# Patient Record
Sex: FEMALE | Race: WHITE | NOT HISPANIC OR LATINO | Employment: UNEMPLOYED | URBAN - METROPOLITAN AREA
[De-identification: names, ages, dates, MRNs, and addresses within clinical notes are randomized per-mention and may not be internally consistent; named-entity substitution may affect disease eponyms.]

---

## 2017-01-24 ENCOUNTER — DOCUMENTATION (OUTPATIENT)
Dept: NEUROLOGY | Facility: CLINIC | Age: 82
End: 2017-01-24

## 2017-01-24 ENCOUNTER — OFFICE VISIT (OUTPATIENT)
Dept: NEUROLOGY | Facility: CLINIC | Age: 82
End: 2017-01-24

## 2017-01-24 VITALS
DIASTOLIC BLOOD PRESSURE: 68 MMHG | SYSTOLIC BLOOD PRESSURE: 130 MMHG | HEIGHT: 56 IN | BODY MASS INDEX: 26.1 KG/M2 | WEIGHT: 116 LBS

## 2017-01-24 DIAGNOSIS — I67.89 OTHER CEREBROVASCULAR DISEASE: Primary | ICD-10-CM

## 2017-01-24 PROCEDURE — 99204 OFFICE O/P NEW MOD 45 MIN: CPT | Performed by: PHYSICIAN ASSISTANT

## 2017-01-24 NOTE — PROGRESS NOTES
Subjective     History of Present Illness   Kisha Galicia is a 87 y.o. female who comes to clinic today for blurry vision, which began on 1/16/17. She noted associated impairments in memory (long term memory more so than short term memory) and orientation as well as associated anxiety. She and her family also note associated balance impairment, though she denies any recent falls. She denies any associated focal weakness, numbness, paresthesias, slurred speech, or dysphagia. She was seen by her PCP, and had an MRI of the brain performed on 1/23/16. Her family notes that this showed a left occipital infarct. However, we do not currently have these results. She also had screening blood work completed, which was notable for a platelet count of 93, but otherwise unremarkable.     She notes that her blurry vision has resolved. She and her family note that her impairments in memory and orientation continue to be present, as well as her balance impairment. She notes that she feels uncomfortable driving due to her impairment in orientation.     It should be noted that she and her family deny any forgetfulness or cognitive impairment prior to this episode. She currently manages her medications and was previously managing her finances as well.       The following portions of the patient's history were reviewed and updated as appropriate: allergies, current medications, past family history, past medical history, past social history, past surgical history and problem list.    Review of Systems   Constitutional: Negative.    HENT: Negative.    Eyes: Negative.    Respiratory: Negative.    Cardiovascular: Negative.    Gastrointestinal: Negative.    Endocrine: Negative.    Genitourinary: Negative.    Musculoskeletal: Negative.    Skin: Negative.    Allergic/Immunologic: Negative.    Neurological:        As noted in HPI   Hematological: Negative.    Psychiatric/Behavioral:        As noted in HPI       Objective     There were no  vitals taken for this visit.    General appearance today is normal.   Peripheral pulses were present and symmetric.   The ophthalmoscopic exam today is unremarkable.     Physical Exam   Neurological: She has normal strength. She has a normal Finger-Nose-Finger Test.   Reflex Scores:       Tricep reflexes are 2+ on the right side and 2+ on the left side.       Bicep reflexes are 2+ on the right side and 2+ on the left side.       Brachioradialis reflexes are 2+ on the right side and 2+ on the left side.       Patellar reflexes are 2+ on the right side and 2+ on the left side.  Psychiatric: Her speech is normal.        Neurologic Exam     Mental Status   Oriented to person.   Disoriented to place. (Oriented to state and floor. )  Oriented to time. Oriented to year, month, date, day and season.   Registration: recalls 3 of 3 objects. Recall of objects at 5 minutes: recalls 0 of 3 objects. Follows 3 step commands.   Attention: normal.   Speech: speech is normal   Level of consciousness: alert  Able to name object. Able to read. Able to repeat. Able to write.     Cranial Nerves   Cranial nerves II through XII intact.     Motor Exam   Muscle bulk: normal  Overall muscle tone: normal    Strength   Strength 5/5 throughout.     Sensory Exam   Light touch normal.     Gait, Coordination, and Reflexes     Gait  Gait: (slightly unsteady upon turning)    Coordination   Finger to nose coordination: normal    Tremor   Resting tremor: absent    Reflexes   Right brachioradialis: 2+  Left brachioradialis: 2+  Right biceps: 2+  Left biceps: 2+  Right triceps: 2+  Left triceps: 2+  Right patellar: 2+  Left patellar: 2+       Rises from chair with ease         Results  MMSE=24      Assessment/Plan   Kisha was seen today for memory loss.    Diagnoses and all orders for this visit:    Other cerebrovascular disease   -     Adult Transthoracic Echo Complete  -     US Color Flow Doppler Carotid Bilateral          Discussion/Summary    Kisha Galicia comes to clinic today following an episode of blurry vision and memory impairment. I am concerned about a potential stroke given her history and her family's report of her recent MRI results. Today, it was elected to obtain her MRI from [facility]. It was also elected to obtain an echo and carotid dopplers to complete her workup. After discussing potential treatment options, it was elected to continue on Simvastatin unchanged. We discussed potentially adding ASA or Plavix. However, it was elected to discuss this further with her primary care provider as she has a history of a gastric bleed. We discussed PT for her balance impairment, though this was ultimately declined. The patient would prefer to follow up in our clinic on an as needed basis for now, which I feel is reasonable.       I spent 45 minutes with the patient and family. I spent 85 percent of this time counseling and discussing diagnosis, diagnostic testing, evaluation, current status, driving, treatment options and management.    As part of this visit I reviewed prior lab results, reviewed outside records and obtained additional history from the family which is incorporated in the HPI.        Kenia Contreras PA-C

## 2017-01-24 NOTE — PROGRESS NOTES
Called [facility] to ask for a disk (Dicom) of the pt's MRI of the brain with and without to be mailed to our office

## 2017-01-24 NOTE — LETTER
January 24, 2017     Davey Sweet MD  [address] [facility] [place] [address]    Patient: Kisha Galicia   YOB: 1929   Date of Visit: 1/24/2017       Dear Dr. Micki MD:    Thank you for referring Kisha Galicia to me for evaluation. Below are the relevant portions of my assessment and plan of care.                   If you have questions, please do not hesitate to call me. I look forward to following Kisha along with you.         Sincerely,        Kenia Contreras PA-C        CC: No Recipients

## 2017-01-24 NOTE — PROGRESS NOTES
Called [facility] (Radiology) at [phone] and made appts for US Color Flow Doppler Carotid Bilateral and the Adult Transthoracic Echo Complete for 2-7-2017 arrive at 12:00 for a 12:30 appt for the Echo and straight into the Carotid.  I faxed over the orders to [phone] for the appt. I called and spoke with the pt's son and ask him to let the pt know of her time and date for the two procedures

## 2017-01-24 NOTE — MR AVS SNAPSHOT
"                        Kisha Frida   2017 2:00 PM   Office Visit    Dept Phone:  [phone]   Encounter #:  [id number]    Provider:  Kenia Contreras PA-C   Department:  [facility] NEUROLOGY                Your Full Care Plan              Your Updated Medication List      Notice  As of 2017  3:13 PM    You have not been prescribed any medications.            Instructions     None    Patient Instructions History      Upcoming Appointments     Visit Type Date Time Department    NEW PATIENT 2017  2:00 PM [facility]      [email or web address] Signup     [facility] "[employer] allows you to send messages to your doctor, view your test results, renew your prescriptions, schedule appointments, and more. To sign up, go to [employer] and click on the Sign Up Now link in the New User? box. Enter your "[employer] Activation Code exactly as it appears below along with the last four digits of your Social Security Number and your Date of Birth () to complete the sign-up process. If you do not sign up before the expiration date, you must request a new code.    "[employer] Activation Code: [email or web address]  Expires: 2017  3:13 PM    If you have questions, you can email [email or web address] or call [phone] to talk to our "[employer] staff. Remember, "[employer] is NOT to be used for urgent needs. For medical emergencies, dial 911.               Other Info from Your Visit           Allergies     No Known Allergies      Reason for Visit     Memory Loss           Vital Signs     Blood Pressure Height Weight Body Mass Index Smoking Status       130/68 56\" (142.2 cm) 116 lb (52.6 kg) 26.01 kg/m2 Never Assessed         "

## 2017-01-25 ENCOUNTER — DOCUMENTATION (OUTPATIENT)
Dept: NEUROLOGY | Facility: CLINIC | Age: 82
End: 2017-01-25

## 2017-01-25 NOTE — PROGRESS NOTES
Called and spoke with pt's son and ask him to let the pt know that the disk of the MRI didn't work and we had to call and request another disk and as soon as we receive and read the disk we will give the family a cb to let them know the Dr. response

## 2017-01-27 ENCOUNTER — DOCUMENTATION (OUTPATIENT)
Dept: NEUROLOGY | Facility: CLINIC | Age: 82
End: 2017-01-27

## 2017-01-30 ENCOUNTER — DOCUMENTATION (OUTPATIENT)
Dept: NEUROLOGY | Facility: CLINIC | Age: 82
End: 2017-01-30

## 2017-01-30 NOTE — PROGRESS NOTES
Called and requested a Disk (diacom) from [facility] Radiology department and spoke with Kristin at [phone] for the 3rd time because the first two were not readable and she said all she could do was make us another one so I ask her to go on and send another one.

## 2017-02-06 DIAGNOSIS — Z86.73 HISTORY OF STROKE: Primary | ICD-10-CM

## 2017-02-07 ENCOUNTER — DOCUMENTATION (OUTPATIENT)
Dept: NEUROLOGY | Facility: CLINIC | Age: 82
End: 2017-02-07

## 2017-02-07 NOTE — PROGRESS NOTES
Called and spoke with Melyssa at [facility] [phone] Scheduling department to make sure she received the PA authorization needed for the Echo to be done on the pt on 2-7-2017 arrival time is 12 pm and also inquired about the new order for the Carotid that was needed to be changed because the diagnosis didn't meet Medicare requirements and she said she would double check on the new diagnosis and if there were a problem she would call me right back but she had received the PA.

## 2017-02-08 ENCOUNTER — DOCUMENTATION (OUTPATIENT)
Dept: NEUROLOGY | Facility: CLINIC | Age: 82
End: 2017-02-08

## 2017-02-08 NOTE — PROGRESS NOTES
Called [facility] Radiology [phone] and requested Disk of the Carotid/Echo pt had done on 2-7-2017 and faxed over a BRADLEY to [phone] as well

## 2017-02-09 ENCOUNTER — TELEPHONE (OUTPATIENT)
Dept: NEUROLOGY | Facility: CLINIC | Age: 82
End: 2017-02-09

## 2017-02-09 DIAGNOSIS — I63.9 CEREBROVASCULAR ACCIDENT (CVA), UNSPECIFIED MECHANISM (HCC): Primary | ICD-10-CM

## 2017-02-09 NOTE — TELEPHONE ENCOUNTER
Called and spoke with pt's son Eliazar and let him know that Kenia and Dr. Ness both viewed the Carotid and Echo and found it to be unremarkable but they would be glad to make a referral for the pt to see a cardiologist.  The son also wanted me to let Dr. Ness know that on two separate occasions one of which was when she went for the MRI and was laid flat complained of feeling funny and possibly passing out.  She never did but felt as though she could.  He wanted to know if this is something she should see Dr. Ness about or who do you recommend?  He would also like the referral for Cardiology